# Patient Record
Sex: FEMALE | Race: WHITE | NOT HISPANIC OR LATINO | URBAN - METROPOLITAN AREA
[De-identification: names, ages, dates, MRNs, and addresses within clinical notes are randomized per-mention and may not be internally consistent; named-entity substitution may affect disease eponyms.]

---

## 2017-10-23 ENCOUNTER — MEDICAL CORRESPONDENCE (OUTPATIENT)
Dept: HEALTH INFORMATION MANAGEMENT | Facility: CLINIC | Age: 37
End: 2017-10-23

## 2017-10-23 ENCOUNTER — TRANSFERRED RECORDS (OUTPATIENT)
Dept: HEALTH INFORMATION MANAGEMENT | Facility: CLINIC | Age: 37
End: 2017-10-23

## 2017-10-30 ENCOUNTER — DOCUMENTATION ONLY (OUTPATIENT)
Dept: GASTROENTEROLOGY | Facility: CLINIC | Age: 37
End: 2017-10-30

## 2017-10-30 NOTE — PROGRESS NOTES
GI notes or primary provider notes related to GI problem   Y    Pathology reports N    Recent Lab  Reports Y    Radiology Reports (CT?MRI) N    Endoscopy N    Colonoscopy N    Referring GI Physician Name     Referring PCP Name Franc Leach     Notes: Celiac Disease    Referral Date 10/26/17    Date Complete Records Received 10/26/17    Date records scanned into epic 10/30/17    Provider Review Date     Date review routed back to Jacinta     Letter sent       Notes

## 2017-10-31 NOTE — PROGRESS NOTES
REFERRAL REVIEW FORM    Referred by: Name Franc Leach    Reason for referral: Celiac disease    Date records reviewed: October 31, 2017    Previous work up:    Labs    Recommendation:    Schedule clinic appointment with general GI provider    When to schedule:  Within 1 month    Date patient was contacted regarding scheduling:     Comments:

## 2017-11-13 ASSESSMENT — ENCOUNTER SYMPTOMS
DEPRESSION: 0
JOINT SWELLING: 0
NAUSEA: 1
PALPITATIONS: 0
SEIZURES: 0
TROUBLE SWALLOWING: 0
SYNCOPE: 0
LEG PAIN: 0
NERVOUS/ANXIOUS: 1
BLOOD IN STOOL: 0
EXERCISE INTOLERANCE: 0
PARALYSIS: 0
MUSCLE CRAMPS: 0
NUMBNESS: 0
HYPOTENSION: 1
ORTHOPNEA: 0
HOT FLASHES: 0
NECK MASS: 0
ARTHRALGIAS: 1
BLOATING: 1
RECTAL PAIN: 0
NAIL CHANGES: 0
MEMORY LOSS: 0
DIARRHEA: 1
BOWEL INCONTINENCE: 0
POOR WOUND HEALING: 0
ABDOMINAL PAIN: 1
JAUNDICE: 0
STIFFNESS: 1
TINGLING: 1
SINUS PAIN: 0
SORE THROAT: 0
DECREASED LIBIDO: 1
BACK PAIN: 1
HYPERTENSION: 0
TASTE DISTURBANCE: 0
DISTURBANCES IN COORDINATION: 0
MUSCLE WEAKNESS: 1
INSOMNIA: 1
MYALGIAS: 1
VOMITING: 0
SINUS CONGESTION: 0
WEAKNESS: 1
SLEEP DISTURBANCES DUE TO BREATHING: 0
HOARSE VOICE: 0
SKIN CHANGES: 1
CONSTIPATION: 1
SPEECH CHANGE: 0
TREMORS: 0
DECREASED CONCENTRATION: 1
DIZZINESS: 1
LIGHT-HEADEDNESS: 1
SMELL DISTURBANCE: 0
NECK PAIN: 0
PANIC: 0
HEARTBURN: 1

## 2017-11-13 NOTE — TELEPHONE ENCOUNTER
APPT INFO    Date /Time: 11/20/17 10:40AM   Reason for Appt: Celiac disease    Ref Provider/Clinic: Debbie [facility], [facility]    Are there internal records? If yes, list: Recs reviewed by Dr. Guardado on 10/30/17   Patient Contact (Y/N) & Call Details: No, pt was referred.   Action: Closing encounter      OUTSIDE RECORDS CHECKLIST     CLINIC NAME COMMENTS REC (x) IMG (x)   formerly [facility] Debbie [facility]  Transferred recs scanned in Epic  X None

## 2017-11-19 ENCOUNTER — HEALTH MAINTENANCE LETTER (OUTPATIENT)
Age: 37
End: 2017-11-19

## 2017-11-20 ENCOUNTER — OFFICE VISIT (OUTPATIENT)
Dept: GASTROENTEROLOGY | Facility: CLINIC | Age: 37
End: 2017-11-20

## 2017-11-20 ENCOUNTER — PRE VISIT (OUTPATIENT)
Dept: GASTROENTEROLOGY | Facility: CLINIC | Age: 37
End: 2017-11-20

## 2017-11-20 VITALS
DIASTOLIC BLOOD PRESSURE: 72 MMHG | BODY MASS INDEX: 27.48 KG/M2 | WEIGHT: 175.1 LBS | TEMPERATURE: 99 F | OXYGEN SATURATION: 98 % | HEIGHT: 67 IN | HEART RATE: 69 BPM | SYSTOLIC BLOOD PRESSURE: 104 MMHG

## 2017-11-20 DIAGNOSIS — R19.7 DIARRHEA, UNSPECIFIED TYPE: ICD-10-CM

## 2017-11-20 DIAGNOSIS — R19.7 DIARRHEA, UNSPECIFIED TYPE: Primary | ICD-10-CM

## 2017-11-20 DIAGNOSIS — K62.5 RECTAL BLEEDING: ICD-10-CM

## 2017-11-20 LAB
ALBUMIN SERPL-MCNC: 4 G/DL (ref 3.4–5)
ALP SERPL-CCNC: 60 U/L (ref 40–150)
ALT SERPL W P-5'-P-CCNC: 30 U/L (ref 0–50)
ANION GAP SERPL CALCULATED.3IONS-SCNC: 6 MMOL/L (ref 3–14)
AST SERPL W P-5'-P-CCNC: 16 U/L (ref 0–45)
BILIRUB DIRECT SERPL-MCNC: <0.1 MG/DL (ref 0–0.2)
BILIRUB SERPL-MCNC: 0.3 MG/DL (ref 0.2–1.3)
BUN SERPL-MCNC: 8 MG/DL (ref 7–30)
CALCIUM SERPL-MCNC: 8.7 MG/DL (ref 8.5–10.1)
CHLORIDE SERPL-SCNC: 104 MMOL/L (ref 94–109)
CO2 SERPL-SCNC: 27 MMOL/L (ref 20–32)
CREAT SERPL-MCNC: 0.6 MG/DL (ref 0.52–1.04)
CRP SERPL-MCNC: <2.9 MG/L (ref 0–8)
ERYTHROCYTE [DISTWIDTH] IN BLOOD BY AUTOMATED COUNT: 13.8 % (ref 10–15)
ERYTHROCYTE [SEDIMENTATION RATE] IN BLOOD BY WESTERGREN METHOD: 9 MM/H (ref 0–20)
FOLATE SERPL-MCNC: 14.4 NG/ML
GFR SERPL CREATININE-BSD FRML MDRD: >90 ML/MIN/1.7M2
GLUCOSE SERPL-MCNC: 84 MG/DL (ref 70–99)
HCT VFR BLD AUTO: 39.6 % (ref 35–47)
HGB BLD-MCNC: 12.4 G/DL (ref 11.7–15.7)
INR PPP: 1.07 (ref 0.86–1.14)
MCH RBC QN AUTO: 24.7 PG (ref 26.5–33)
MCHC RBC AUTO-ENTMCNC: 31.3 G/DL (ref 31.5–36.5)
MCV RBC AUTO: 79 FL (ref 78–100)
PLATELET # BLD AUTO: 243 10E9/L (ref 150–450)
POTASSIUM SERPL-SCNC: 3.9 MMOL/L (ref 3.4–5.3)
PROT SERPL-MCNC: 7.5 G/DL (ref 6.8–8.8)
RBC # BLD AUTO: 5.03 10E12/L (ref 3.8–5.2)
SODIUM SERPL-SCNC: 137 MMOL/L (ref 133–144)
VIT B12 SERPL-MCNC: 825 PG/ML (ref 193–986)
WBC # BLD AUTO: 6 10E9/L (ref 4–11)

## 2017-11-20 RX ORDER — TRANEXAMIC ACID 650 MG/1
TABLET ORAL
COMMUNITY
Start: 2017-09-26

## 2017-11-20 RX ORDER — FLUOXETINE 10 MG/1
10 TABLET, FILM COATED ORAL
COMMUNITY
Start: 2017-07-26

## 2017-11-20 ASSESSMENT — PAIN SCALES - GENERAL: PAINLEVEL: NO PAIN (0)

## 2017-11-20 NOTE — NURSING NOTE
Printed  after visit summary given to pt along with verbal instructions.   Scheduled for egd and colonoscopy.  Pt sent to the lab. Has follow up appt.

## 2017-11-20 NOTE — MR AVS SNAPSHOT
After Visit Summary   11/20/2017    Jenn Jean    MRN: [id number]           Patient Information     Date Of Birth          1980        Visit Information        Provider Department      11/20/2017 10:40 AM Jeramy Geller MD [facility]        Today's Diagnoses     Diarrhea, unspecified type    -  1      Care Instructions    Nice to meet you today      Labs today  Lab tests today at the 1st floor -- you will be notified of results by letter or my chart message in 7-10 days.  You will receive a phone call if more urgent follow up is needed.      Schedule upper endoscopy and colonoscopy   You are scheduled on December 13  Check in time 12 noon   You will be mailed the instructions  You will be contacted by pre assessment nurse about one week prior       Follow after procedures with Dr. Geller         For questions regarding your care Monday through Friday, contact the RN GI care coordinator,  Call   [phone] option 3 . Your call will be  returned same day, or if consultation is needed with the provider, it may be following business day - or you may send a My Chart message.    For medication refills (prescribed by the GI clinic), contact your pharmacy.    For appointment rescheduling/cancellation, contact [phone]     After hours, or if you have an immediate GI concern and cannot wait for a return call, contact the GI Fellow at [phone] and select option #4.     Thanks Katy Bartholomew RN Care Coordinator for Dr. Shoemaker   Phone   [phone]               Follow-ups after your visit        Additional Services     GASTROENTEROLOGY ADULT REF PROCEDURE ONLY       Last Lab Result: No results found for: CR  Body mass index is 27.42 kg/(m^2).     Needed:  No  Language:  English    Patient will be contacted to schedule procedure.     Please be aware that coverage of these services is subject to the terms and limitations of your health insurance plan.   Call member services at your health plan with any benefit or coverage questions.  Any procedures must be performed at a [place] facility OR coordinated by your clinic's referral office.    Please bring the following with you to your appointment:    (1) Any X-Rays, CTs or MRIs which have been performed.  Contact the facility where they were done to arrange for  prior to your scheduled appointment.    (2) List of current medications   (3) This referral request   (4) Any documents/labs given to you for this referral                  Your next 10 appointments already scheduled     Dec 13, 2017  1:00 PM CST   Upper Endoscopy with Ivan Shoemaker MD   [facility] ([facility])    [address]  [place] [address] [phone]            Feb 12, 2018 10:40 AM CST   (Arrive by 10:25 AM)   Return Visit with Jeramy Geller MD   [facility] ([facility])    [address] [place] [address] [phone]              Future tests that were ordered for you today     Open Future Orders        Priority Expected Expires Ordered    CBC with platelets Routine  2/18/2018 11/20/2017    Basic metabolic panel Routine  2/18/2018 11/20/2017    Hepatic panel Routine  2/18/2018 11/20/2017    INR Routine  2/18/2018 11/20/2017    Vitamin B12 Routine  2/18/2018 11/20/2017    Folate Routine  2/18/2018 11/20/2017    IgA Routine  11/21/2018 11/20/2017    Tissue transglutaminase antibody IgA Routine  11/21/2018 11/20/2017    CRP inflammation Routine  2/18/2018 11/20/2017    Erythrocyte sedimentation rate auto Routine  2/18/2018 11/20/2017            Who to contact     Please call your clinic at [phone] to:    Ask questions about your health    Make or cancel appointments    Discuss your medicines    Learn about your test results    Speak to your doctor   If you have compliments or concerns  "about an experience at your clinic, or if you wish to file a complaint, please contact [facility] Patient Relations at [phone] or email us at [email or web address]         Additional Information About Your Visit        DailyTickethart Information     [employer] gives you secure access to your electronic health record. If you see a primary care provider, you can also send messages to your care team and make appointments. If you have questions, please call your primary care clinic.  If you do not have a primary care provider, please call [phone] and they will assist you.      [employer] is an electronic gateway that provides easy, online access to your medical records. With [employer], you can request a clinic appointment, read your test results, renew a prescription or communicate with your care team.     To access your existing account, please contact your [facility] or call [phone] for assistance.        Care EveryWhere ID     This is your Care EveryWhere ID. This could be used by other organizations to access your [place] medical records  [id number]        Your Vitals Were     Pulse Temperature Height Pulse Oximetry BMI (Body Mass Index)       69 99  F (37.2  C) 1.702 m (5' 7\") 98% 27.42 kg/m2        Blood Pressure from Last 3 Encounters:   11/20/17 104/72    Weight from Last 3 Encounters:   11/20/17 79.4 kg (175 lb 1.6 oz)              We Performed the Following     GASTROENTEROLOGY ADULT REF PROCEDURE ONLY        Primary Care Provider    None Specified       No primary provider on file.        Equal Access to Services     SERENITY MADRID : Rika Tillman, sunni chavez, bobo conner . So [facility] [phone].    ATENCIÓN: Si habla español, tiene a valencia disposición servicios gratuitos de asistencia lingüística. Llame al [phone].    We comply with applicable federal civil rights " laws and [place] laws. We do not discriminate on the basis of race, color, national origin, age, disability, sex, sexual orientation, or gender identity.            Thank you!     Thank you for choosing [facility]  for your care. Our goal is always to provide you with excellent care. Hearing back from our patients is one way we can continue to improve our services. Please take a few minutes to complete the written survey that you may receive in the mail after your visit with us. Thank you!             Your Updated Medication List - Protect others around you: Learn how to safely use, store and throw away your medicines at www.disposemymeds.org.          This list is accurate as of: 11/20/17 12:40 PM.  Always use your most recent med list.                   Brand Name Dispense Instructions for use Diagnosis    FLUoxetine 10 MG tablet    PROzac     Take 10 mg by mouth        tranexamic acid 650 MG tablet    LYSTEDA     Take two tablets PO tid for up to 5 days PRN menorrhagia

## 2017-11-20 NOTE — LETTER
11/20/2017       RE: Jenn Jean  [address]  [place] [address]     Dear Colleague,    Thank you for referring your patient, Jenn Jean, to the [facility] at [facility]. Please see a copy of my visit note below.    GI CLINIC VISIT - NEW PATIENT    CC/REFERRING MD:  [facility]*  REASON FOR CONSULTATION:  Concern for celiac disease     HPI:  37 year old female with history of anxiety/depression presented with concerns of celiac disease. She begun having some Gi symptoms about 1 1/2 years ago, namely going from having 1 regular bowel movement per day to having anywhere between 0-5 semi formed stools a day, mostly during the day. She also has some cramping and vague abdominal discomfort in her right lower quadrant. About one year ago, she had an incident where she felt very oozy and sick, and felt urge to defecate; she went home and had significant amount of hematochezia; she didn't seek medical attention at that time. She continues to have intermittent rectal bleeding which she attributes to hemorrhoids. On 4/9/2017 she stopped smoking, and since then she's gained 30 pounds which she's very frustrated about. 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 Two of her children have celiac disease, and one of those kids also has Crohns and is now on remicade and mesalamine bid. It sounds like she had a celiac panel checked and one antibody test returned positive, further raising the suspicion for celiac disease. She also reports sensation of wanting to defecate but unable to, though she doesn't feel constipated and her stool is usually soft. She feels her anxiety/depression is controlled well on Prozac.     ROS:  10pt ROS performed and otherwise negative.    PERTINENT PAST MEDICAL HISTORY:  As noted above.    PREVIOUS ABDOMINAL/GYNECOLOGIC SURGERIES:  5 vaginal  deliveries.   Appendectomy.     PREVIOUS ENDOSCOPY:  None.     PERTINENT MEDICATIONS:  Medications reviewed with patient today, see Medication List/Assessment for details.  No other NSAID/anticoagulation reported by patient.  No other OTC/herbal/supplements reported by patient.    SOCIAL HISTORY:  Quite smoking on 4/19/2017; she had been smoking since she was 8!  Social drinker.    Works as a  in a landfill.     FAMILY HISTORY:  Two kids with celiac, one of whom also has Crohns.     PHYSICAL EXAMINATION:  Vitals reviewed, AFVSS  Gen: aaox3, cooperative, pleasant, not dyspneic/diaphoretic, nad  HEENT: ncat, neck supple, no clad/sclad, normal op w/o ulcer/exudate, anicteric, mmm  Resp/CV unremarkable  Abd:  soft  Ext: no c/c/e  Skin: warm, perfused, no jaundice  Neuro: grossly intact, no asterixis noted    PERTINENT STUDIES:  wbc 4.9, hgb 12, plt 202, mcv 78    No results found for any previous visit.    ASSESSMENT/PLAN:    1.  Concern For Celiac Disease   Her TTG-IgA is very high at 1262.9, total IgA normal; with family hx, this certainly raises suspicion for celiac disease. What is unusual is her recent weight gain which may be partly due to her stopping her tobacco use which she's been doing since she was 8. She's currently not adhering to a gluten free diet which allows us to pursue further confirmatory testing.   - EGD with duodenal biopsy   - check nutritional markers; she does have microcytosis without anemia; will check iron/ferritin, b12, folate tsh    2. Intermittent Rectal Bleeding, RLQ Abd Pain, FH of Crohns  Her one isolated episode of severe hematochezia which has not recurred could be due to diverticular bleeding or ischemic colitis; she's had some intermittent rectal bleeding is likely due to hemorrhoids but other causes of lower gi bleeding should be ruled out, especially with family hx of Crohn's disease.   - nutritional markers as above   - colonoscopy along with the EGD   - if  colonoscopy negative, may consider CT/MR enterography, and if this is negative may work her up for IBS or pelvic floor dysfunction     RTC 2-3 months, sooner if symptomatic.     Thank you for this consultation.  It was a pleasure to participate in the care of this patient; please contact us with any further questions.  A total of 60 minutes was spent with this patient, 50% of which was counseling regarding the above delineated issues.    Jeramy Geller MD  GI Fellow  [facility]   Department of Gastroenterology, Hepatology and Nutrition    ATTENDING ATTESTATION:     DATE SEEN: 11/20/2017    Patient was discussed, seen, and examined by me, Ivan Shoemaker. The plan of care and pertinent data/imaging were also reviewed with the GI Fellow. Agree with the assessment and plan as delineated above.    Please contact me with any further questions.    Ivan Shoemaker MD    [facility]  Division of Gastroenterology, Hepatology and Nutrition      Again, thank you for allowing me to participate in the care of your patient.      Sincerely,    Jeramy Geller MD

## 2017-11-20 NOTE — PATIENT INSTRUCTIONS
Nice to meet you today      Labs today  Lab tests today at the 1st floor -- you will be notified of results by letter or my chart message in 7-10 days.  You will receive a phone call if more urgent follow up is needed.      Schedule upper endoscopy and colonoscopy   You are scheduled on December 13  Check in time 12 noon   You will be mailed the instructions  You will be contacted by pre assessment nurse about one week prior       Follow after procedures with Dr. Geller         For questions regarding your care Monday through Friday, contact the RN GI care coordinator,  Call   [phone] option 3 . Your call will be  returned same day, or if consultation is needed with the provider, it may be following business day - or you may send a My Chart message.    For medication refills (prescribed by the GI clinic), contact your pharmacy.    For appointment rescheduling/cancellation, contact [phone]     After hours, or if you have an immediate GI concern and cannot wait for a return call, contact the GI Fellow at [phone] and select option #4.     Thanks Katy Bartholomew RN Care Coordinator for Dr. Shoemaker   Phone   [phone]

## 2017-11-20 NOTE — NURSING NOTE
"No chief complaint on file.      Vitals:    11/20/17 1046   BP: 104/72   BP Location: Left arm   Patient Position: Chair   Cuff Size: Adult Regular   Pulse: 69   Temp: 99  F (37.2  C)   SpO2: 98%   Weight: 175 lb 1.6 oz   Height: 5' 7\"       Body mass index is 27.42 kg/(m^2).      Miri Tomlinson                          "

## 2017-11-21 LAB
FERRITIN SERPL-MCNC: 5 NG/ML (ref 12–150)
IGA SERPL-MCNC: 177 MG/DL (ref 70–380)
TIBC SERPL-MCNC: 461 UG/DL (ref 240–430)
TSH SERPL DL<=0.005 MIU/L-ACNC: 1.58 MU/L (ref 0.4–4)
TTG IGA SER-ACNC: >128 U/ML

## 2017-11-21 NOTE — PROGRESS NOTES
GI CLINIC VISIT - NEW PATIENT    CC/REFERRING MD:  [facility]*  REASON FOR CONSULTATION:  Concern for celiac disease     HPI:  37 year old female with history of anxiety/depression presented with concerns of celiac disease. She begun having some Gi symptoms about 1 1/2 years ago, namely going from having 1 regular bowel movement per day to having anywhere between 0-5 semi formed stools a day, mostly during the day. She also has some cramping and vague abdominal discomfort in her right lower quadrant. About one year ago, she had an incident where she felt very oozy and sick, and felt urge to defecate; she went home and had significant amount of hematochezia; she didn't seek medical attention at that time. She continues to have intermittent rectal bleeding which she attributes to hemorrhoids. On 4/9/2017 she stopped smoking, and since then she's gained 30 pounds which she's very frustrated about. 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 Two of her children have celiac disease, and one of those kids also has Crohns and is now on remicade and mesalamine bid. It sounds like she had a celiac panel checked and one antibody test returned positive, further raising the suspicion for celiac disease. She also reports sensation of wanting to defecate but unable to, though she doesn't feel constipated and her stool is usually soft. She feels her anxiety/depression is controlled well on Prozac.     ROS:  10pt ROS performed and otherwise negative.    PERTINENT PAST MEDICAL HISTORY:  As noted above.    PREVIOUS ABDOMINAL/GYNECOLOGIC SURGERIES:  5 vaginal deliveries.   Appendectomy.     PREVIOUS ENDOSCOPY:  None.     PERTINENT MEDICATIONS:  Medications reviewed with patient today, see Medication List/Assessment for details.  No other NSAID/anticoagulation reported by patient.  No other OTC/herbal/supplements reported by patient.    SOCIAL  HISTORY:  Quite smoking on 4/19/2017; she had been smoking since she was 8!  Social drinker.    Works as a  in a landfill.     FAMILY HISTORY:  Two kids with celiac, one of whom also has Crohns.     PHYSICAL EXAMINATION:  Vitals reviewed, AFVSS  Gen: aaox3, cooperative, pleasant, not dyspneic/diaphoretic, nad  HEENT: ncat, neck supple, no clad/sclad, normal op w/o ulcer/exudate, anicteric, mmm  Resp/CV unremarkable  Abd:  soft  Ext: no c/c/e  Skin: warm, perfused, no jaundice  Neuro: grossly intact, no asterixis noted    PERTINENT STUDIES:  wbc 4.9, hgb 12, plt 202, mcv 78    No results found for any previous visit.    ASSESSMENT/PLAN:    1.  Concern For Celiac Disease   Her TTG-IgA is very high at 1262.9, total IgA normal; with family hx, this certainly raises suspicion for celiac disease. What is unusual is her recent weight gain which may be partly due to her stopping her tobacco use which she's been doing since she was 8. She's currently not adhering to a gluten free diet which allows us to pursue further confirmatory testing.   - EGD with duodenal biopsy   - check nutritional markers; she does have microcytosis without anemia; will check iron/ferritin, b12, folate tsh    2. Intermittent Rectal Bleeding, RLQ Abd Pain, FH of Crohns  Her one isolated episode of severe hematochezia which has not recurred could be due to diverticular bleeding or ischemic colitis; she's had some intermittent rectal bleeding is likely due to hemorrhoids but other causes of lower gi bleeding should be ruled out, especially with family hx of Crohn's disease.   - nutritional markers as above   - colonoscopy along with the EGD   - if colonoscopy negative, may consider CT/MR enterography, and if this is negative may work her up for IBS or pelvic floor dysfunction     RTC 2-3 months, sooner if symptomatic.     Thank you for this consultation.  It was a pleasure to participate in the care of this patient; please contact  us with any further questions.  A total of 60 minutes was spent with this patient, 50% of which was counseling regarding the above delineated issues.    Jeramy Geller MD  GI Fellow  [facility]   Department of Gastroenterology, Hepatology and Nutrition    ATTENDING ATTESTATION:     DATE SEEN: 11/20/2017    Patient was discussed, seen, and examined by me, Ivan Shoemaker. The plan of care and pertinent data/imaging were also reviewed with the GI Fellow. Agree with the assessment and plan as delineated above.    Please contact me with any further questions.    Ivan Shoemaker MD    [facility]  Division of Gastroenterology, Hepatology and Nutrition          Answers for HPI/ROS submitted by the patient on 11/13/2017   General Symptoms: No  Skin Symptoms: Yes  HENT Symptoms: Yes  EYE SYMPTOMS: No  HEART SYMPTOMS: Yes  LUNG SYMPTOMS: No  INTESTINAL SYMPTOMS: Yes  URINARY SYMPTOMS: No  GYNECOLOGIC SYMPTOMS: Yes  BREAST SYMPTOMS: No  SKELETAL SYMPTOMS: Yes  BLOOD SYMPTOMS: No  NERVOUS SYSTEM SYMPTOMS: Yes  MENTAL HEALTH SYMPTOMS: Yes  Changes in hair: No  Changes in moles/birth marks: Yes  Itching: Yes  Rashes: Yes  Changes in nails: No  Acne: Yes  Hair in places you don't want it: No  Change in facial hair: No  Warts: No  Non-healing sores: No  Scarring: No  Flaking of skin: Yes  Color changes of hands/feet in cold : Yes  Sun sensitivity: No  Skin thickening: Yes  Ear pain: No  Ear discharge: Yes  Hearing loss: No  Tinnitus: Yes  Nosebleeds: No  Congestion: No  Sinus pain: No  Trouble swallowing: No   Voice hoarseness: No  Mouth sores: No  Sore throat: No  Tooth pain: Yes  Gum tenderness: Yes  Bleeding gums: Yes  Change in taste: No  Change in sense of smell: No  Dry mouth: No  Hearing aid used: No  Neck lump: No  Chest pain or pressure: No  Fast or irregular heartbeat: No  Pain in legs with walking: No  Trouble breathing while lying down: No  Fingers or toes appear blue:  Yes  High blood pressure: No  Low blood pressure: Yes  Fainting: No  Murmurs: No  Pacemaker: No  Varicose veins: No  Edema or swelling: Yes  Wake up at night with shortness of breath: No  Light-headedness: Yes  Exercise intolerance: No  Heart burn or indigestion: Yes  Nausea: Yes  Vomiting: No  Abdominal pain: Yes  Bloating: Yes  Constipation: Yes  Diarrhea: Yes  Blood in stool: No  Black stools: No  Rectal or Anal pain: No  Fecal incontinence: No  Yellowing of skin or eyes: No  Vomit with blood: No  Change in stools: No  Back pain: Yes  Muscle aches: Yes  Neck pain: No  Swollen joints: No  Joint pain: Yes  Bone pain: No  Muscle cramps: No  Muscle weakness: Yes  Joint stiffness: Yes  Bone fracture: No  Trouble with coordination: No  Dizziness or trouble with balance: Yes  Memory loss: No  Seizures: No  Speech problems: No  Tingling: Yes  Tremor: No  Weakness: Yes  Difficulty walking: No  Paralysis: No  Numbness: No  Bleeding or spotting between periods: No  Heavy or painful periods: Yes  Irregular periods: Yes  Vaginal discharge: Yes  Hot flashes: No  Vaginal dryness: No  Genital ulcers: No  Reduced libido: Yes  Painful intercourse: No  Difficulty with sexual arousal: Yes  Nervous or Anxious: Yes  Depression: No  Trouble sleeping: Yes  Trouble thinking or concentrating: Yes  Mood changes: No  Panic attacks: No

## 2017-12-06 ENCOUNTER — TELEPHONE (OUTPATIENT)
Dept: GASTROENTEROLOGY | Facility: OUTPATIENT CENTER | Age: 37
End: 2017-12-06

## 2017-12-06 NOTE — TELEPHONE ENCOUNTER
Patient taking any blood thinners ? no    Heart disease ? denies    Lung disease ? denies      Sleep apnea ? denies    Diabetic ? denies    Kidney disease ? denies    Dialysis ? n/a    Electronic implanted medical devices ? denies    Are you taking any narcotic pain medication ? no  What is your daily dosage ?    PTSD ? n/a    Prep instructions reviewed with patient ? Patient declined review.  policy, MAC sedation plan reviewed. Advised patient to have someone stay with her post exam. Patient states she is not pregnant or lactating    Ph[facility] : [facility]    Indication for procedure : Diarrhea, unspecified type [R19.7    Referring provider :Jeramy Geller MD     Arrival Time : Instructed patient to arrive at 12 PM

## 2017-12-12 DIAGNOSIS — Z12.11 ENCOUNTER FOR SCREENING COLONOSCOPY: Primary | ICD-10-CM

## 2017-12-13 ENCOUNTER — TRANSFERRED RECORDS (OUTPATIENT)
Dept: HEALTH INFORMATION MANAGEMENT | Facility: CLINIC | Age: 37
End: 2017-12-13

## 2017-12-13 ENCOUNTER — DOCUMENTATION ONLY (OUTPATIENT)
Dept: GASTROENTEROLOGY | Facility: OUTPATIENT CENTER | Age: 37
End: 2017-12-13
Payer: MEDICAID

## 2017-12-21 LAB — COPATH REPORT: NORMAL

## 2017-12-22 ENCOUNTER — CARE COORDINATION (OUTPATIENT)
Dept: GASTROENTEROLOGY | Facility: CLINIC | Age: 37
End: 2017-12-22

## 2017-12-22 NOTE — PROGRESS NOTES
Pt called in asking for her pathology results from egd and colonoscopy dorm December 13. Briefly read report and told pt that Dr. Shoemaker will send a message when he releases report. In basket message to Dr. Shoemaker.  Pt wanting to know what caused the erosions?   Pt denies any use of NSAID'S.

## 2020-03-01 ENCOUNTER — HEALTH MAINTENANCE LETTER (OUTPATIENT)
Age: 40
End: 2020-03-01

## 2020-12-14 ENCOUNTER — HEALTH MAINTENANCE LETTER (OUTPATIENT)
Age: 40
End: 2020-12-14

## 2021-04-17 ENCOUNTER — HEALTH MAINTENANCE LETTER (OUTPATIENT)
Age: 41
End: 2021-04-17

## 2021-10-02 ENCOUNTER — HEALTH MAINTENANCE LETTER (OUTPATIENT)
Age: 41
End: 2021-10-02

## 2022-05-08 ENCOUNTER — HEALTH MAINTENANCE LETTER (OUTPATIENT)
Age: 42
End: 2022-05-08

## 2023-01-14 ENCOUNTER — HEALTH MAINTENANCE LETTER (OUTPATIENT)
Age: 43
End: 2023-01-14

## 2023-06-02 ENCOUNTER — HEALTH MAINTENANCE LETTER (OUTPATIENT)
Age: 43
End: 2023-06-02

## 2024-02-17 ENCOUNTER — HEALTH MAINTENANCE LETTER (OUTPATIENT)
Age: 44
End: 2024-02-17